# Patient Record
(demographics unavailable — no encounter records)

---

## 2017-06-06 NOTE — PHYS DOC
Past Medical History


Past Medical History:  No Pertinent History


Past Surgical History:  No Surgical History


Alcohol Use:  None


Drug Use:  None





General Pediatric Assessment


History of Present Illness


History of Present Illness





8 y/o male presents to the emergency department with parent. Parent states he 

has had a rash on bilateral arms, and right side of face and neck for the last 

2 days. She states they had a URI last week. She denies fever, chills, nausea 

and vomiting. Parent states she has not given him anything for the rash other 

than placing neosporin over the site. Patient states the areas itch although no 

drainage or discharge noted from the site.





Review of Systems


Review of Systems





Constitutional: Denies fever or chills []


Eyes: Denies change in visual acuity, redness, or eye pain []


HENT: Denies nasal congestion or sore throat []


Respiratory: Denies cough or shortness of breath []


Cardiovascular: No additional information not addressed in HPI []


GI: Denies abdominal pain, nausea, vomiting, bloody stools or diarrhea []


: Denies dysuria or hematuria []


Musculoskeletal: Denies back pain or joint pain []


Integument: rash denies skin lesions []


Neurologic: Denies headache, focal weakness or sensory changes []


Endocrine: Denies polyuria or polydipsia []





Allergies


Allergies





Allergies








Coded Allergies Type Severity Reaction Last Updated Verified


 


  No Known Drug Allergies    1/14/14 No











Physical Exam


Physical Exam





Constitutional: Well developed, well nourished, no acute distress, non-toxic 

appearance, positive interaction.


HENT: Normocephalic, atraumatic, bilateral external ears normal, oropharynx 

moist, no oral exudates, nose normal. [] 


Eyes: PERRLA, conjunctiva normal, no discharge. []


Neck: Normal range of motion, no tenderness, supple, no stridor. []


Cardiovascular: Normal heart rate, normal rhythm, no murmurs, no rubs, no 

gallops. []


Thorax and Lungs: Normal breath sounds, no respiratory distress, no wheezing, 

no chest tenderness, no retractions, no accessory muscle use. []


Skin: Warm, dry, no erythema. Patient with red raised sand paper type rash 

noted to the bilateral arms and left side of face and neck. No drainage or 

discharge noted from the site.


Back: No tenderness


Extremities: Intact distal pulses, no tenderness, no cyanosis, ROM intact, no 

edema, no deformities. [] 


Neurologic: Alert and interactive, normal motor function, normal sensory 

function, no focal deficits noted. []





Radiology/Procedures


Radiology/Procedures


[]





Course & Med Decision Making


Course & Med Decision Making


Pertinent Labs and Imaging studies reviewed. (See chart for details)


Rapid strep negative. Patient will be placed on Benadryl and prelone to help 

with the rash. He will be recommended to keep the area cool and dry. Aveeno 

baths may also help soothe the skin. Parent was instructed that benadryl will 

cause drowsiness do not take if you need to be alert and oriented. Parent 

agrees with discharge instructions, treatment regimen and followup 

recommendations.


[]





Dragon Disclaimer


Dragon Disclaimer


This electronic medical record was generated, in whole or in part, using a 

voice recognition dictation system.





Departure


Departure


Impression:  


 Primary Impression:  


 Contact dermatitis


Disposition:  01 HOME, SELF-CARE


Condition:  STABLE


Referrals:  


DEONTE SPEARS MD (PCP)


Patient Instructions:  Contact Dermatitis, Easy-to-Read





Additional Instructions:  


You son has been evaluated for a rash


Keep the area clean and cool


Benadryl as needed for itching, this medication will cause drowsiness do not 

take if you need to be alert and oriented


Medication as prescribed


Followup with primary care provider in 3-5 days


Return to emergency department as needed for signs and symptoms that become 

worse.


Scripts


Prednisolone (PREDNISOLONE) 15 Mg/5 Ml Solution


23 MG PO DAILY for 7 Days


   Prov: MARTHA BOOGIE         6/6/17











MARTHA BOOGIE Jun 6, 2017 08:04